# Patient Record
Sex: FEMALE | Race: BLACK OR AFRICAN AMERICAN | NOT HISPANIC OR LATINO | Employment: UNEMPLOYED | URBAN - METROPOLITAN AREA
[De-identification: names, ages, dates, MRNs, and addresses within clinical notes are randomized per-mention and may not be internally consistent; named-entity substitution may affect disease eponyms.]

---

## 2023-10-27 ENCOUNTER — TELEPHONE (OUTPATIENT)
Dept: OBSTETRICS AND GYNECOLOGY | Facility: CLINIC | Age: 38
End: 2023-10-27
Payer: COMMERCIAL

## 2023-10-27 DIAGNOSIS — Z30.41 ENCOUNTER FOR SURVEILLANCE OF CONTRACEPTIVE PILLS: ICD-10-CM

## 2023-10-27 RX ORDER — DROSPIRENONE 4 MG/1
4 TABLET, FILM COATED ORAL DAILY
Qty: 90 TABLET | Refills: 0 | OUTPATIENT
Start: 2023-10-27

## 2023-10-27 NOTE — TELEPHONE ENCOUNTER
Spoke with pt-last seen 7/2022 by MARIE Trinidad.  Informed pt she needs to schedule AE prior to RF's.   Pt scheduled 11/28 with MARIE Trinidad at  office per request.   Pt requesting Rx RF of Uzield to get her to appointment.  Order pended if approved.

## 2023-11-28 ENCOUNTER — OFFICE VISIT (OUTPATIENT)
Dept: OBSTETRICS AND GYNECOLOGY | Facility: CLINIC | Age: 38
End: 2023-11-28
Payer: COMMERCIAL

## 2023-11-28 VITALS
BODY MASS INDEX: 27.61 KG/M2 | DIASTOLIC BLOOD PRESSURE: 64 MMHG | HEIGHT: 66 IN | SYSTOLIC BLOOD PRESSURE: 104 MMHG | WEIGHT: 171.8 LBS

## 2023-11-28 DIAGNOSIS — N89.8 VAGINAL DISCHARGE: ICD-10-CM

## 2023-11-28 DIAGNOSIS — Z01.419 WELL WOMAN EXAM: Primary | ICD-10-CM

## 2023-11-28 DIAGNOSIS — Z30.011 ENCOUNTER FOR INITIAL PRESCRIPTION OF CONTRACEPTIVE PILLS: ICD-10-CM

## 2023-11-28 PROCEDURE — 99395 PREV VISIT EST AGE 18-39: CPT | Performed by: ADVANCED PRACTICE MIDWIFE

## 2023-11-28 PROCEDURE — 87205 SMEAR GRAM STAIN: CPT

## 2023-11-28 RX ORDER — DOCUSATE SODIUM 100 MG/1
CAPSULE, LIQUID FILLED ORAL
COMMUNITY
Start: 2022-06-28 | End: 2024-01-23

## 2023-11-28 RX ORDER — METRONIDAZOLE 7.5 MG/G
GEL VAGINAL
Qty: 70 G | Refills: 3 | Status: SHIPPED | OUTPATIENT
Start: 2023-11-28 | End: 2024-06-10 | Stop reason: WASHOUT

## 2023-11-28 RX ORDER — PNV NO.95/FERROUS FUM/FOLIC AC 28MG-0.8MG
TABLET ORAL
COMMUNITY

## 2023-11-28 RX ORDER — SERTRALINE HYDROCHLORIDE 50 MG/1
50 TABLET, FILM COATED ORAL DAILY
COMMUNITY
End: 2024-06-10 | Stop reason: WASHOUT

## 2023-11-28 RX ORDER — L.ACID,FERM,PLA,RHA/B.BIF,LONG 126 MG
TABLET, DELAYED AND EXTENDED RELEASE ORAL
COMMUNITY
Start: 2022-07-21

## 2023-11-28 RX ORDER — DROSPIRENONE 4 MG/1
4 TABLET, FILM COATED ORAL DAILY
Qty: 30 TABLET | Refills: 11 | Status: SHIPPED | OUTPATIENT
Start: 2023-11-28 | End: 2024-06-10

## 2023-11-28 RX ORDER — TRAZODONE HYDROCHLORIDE 50 MG/1
50 TABLET ORAL NIGHTLY
COMMUNITY
Start: 2023-06-27 | End: 2024-06-10 | Stop reason: WASHOUT

## 2023-11-28 RX ORDER — DROSPIRENONE 4 MG/1
TABLET, FILM COATED ORAL
COMMUNITY
Start: 2022-08-29

## 2023-11-28 RX ORDER — SERTRALINE HYDROCHLORIDE 100 MG/1
100 TABLET, FILM COATED ORAL DAILY
COMMUNITY
End: 2024-06-10 | Stop reason: WASHOUT

## 2023-11-28 ASSESSMENT — PAIN SCALES - GENERAL: PAINLEVEL: 0-NO PAIN

## 2023-11-28 NOTE — PROGRESS NOTES
Assessment/Plan   Problem List Items Addressed This Visit    None  Visit Diagnoses         Codes    Well woman exam    -  Primary Z01.419    Vaginal discharge     N89.8            Julissa Trinidad, APRN-CNM     Subjective   Kaylee Mclean is a 38 y.o. female who is here for a routine exam. Periods are regular every 28-30 days, lasting 4 days. Dysmenorrhea:mild, occurring first 1-2 days of flow. Cyclic symptoms include bloating, breast tenderness, and headache. No intermenstrual bleeding, spotting, or discharge.    Current contraception: none - desires to restart Slynd  [unfilled]   History of abnormal Pap smear: no  Family history of uterine or ovarian cancer: no  [unfilled]   Family history of breast cancer: no  Regular self breast exam: no  History of abnormal mammogram: no    Menstrual History:  OB History    No obstetric history on file.        Menarche age: 14  No LMP recorded.         Review of Systems  Vaginal discharge   All other ROS negative    Objective   There were no vitals taken for this visit.    General:   Alert and oriented x 3   Heart:  Thyroid: Regular rate, rhythm  Euthyroid, normal shape and size   Lungs:  Breast: Clear to auscultation bilaterally  Symmetrical, no skin changes/nipple discharge, redness, tenderness, no masses palpated bilaterally   Abdomen: Soft, non tender   Vulva: EGBUS normal   Vagina: Pink, normal discharge   Cervix: No CMT   Uterus: Normal shape, size   Adnexa: NT bilaterally

## 2023-11-29 LAB
CLUE CELLS VAG LPF-#/AREA: PRESENT /[LPF]
NUGENT SCORE: 8
YEAST VAG WET PREP-#/AREA: ABNORMAL

## 2024-01-21 DIAGNOSIS — Z76.0 MEDICATION REFILL: ICD-10-CM

## 2024-01-23 RX ORDER — DOCUSATE SODIUM 100 MG/1
100 CAPSULE, LIQUID FILLED ORAL DAILY PRN
Qty: 30 CAPSULE | Refills: 5 | Status: SHIPPED | OUTPATIENT
Start: 2024-01-23

## 2024-01-23 NOTE — TELEPHONE ENCOUNTER
Rx Request received. Medication and pharmacy populated. Pt is overdue for physical, has appt scheduled for 1/25/24.    VICKI LUNDBERG on 1/23/24 at 8:57 AM.

## 2024-01-25 ENCOUNTER — OFFICE VISIT (OUTPATIENT)
Dept: PRIMARY CARE | Facility: CLINIC | Age: 39
End: 2024-01-25
Payer: COMMERCIAL

## 2024-01-25 VITALS
TEMPERATURE: 98.1 F | RESPIRATION RATE: 18 BRPM | HEART RATE: 76 BPM | WEIGHT: 170.1 LBS | BODY MASS INDEX: 27.34 KG/M2 | OXYGEN SATURATION: 98 % | HEIGHT: 66 IN | DIASTOLIC BLOOD PRESSURE: 78 MMHG | SYSTOLIC BLOOD PRESSURE: 112 MMHG

## 2024-01-25 DIAGNOSIS — M70.62 GREATER TROCHANTERIC BURSITIS OF LEFT HIP: Primary | ICD-10-CM

## 2024-01-25 PROCEDURE — 1036F TOBACCO NON-USER: CPT | Performed by: FAMILY MEDICINE

## 2024-01-25 PROCEDURE — 99214 OFFICE O/P EST MOD 30 MIN: CPT | Performed by: FAMILY MEDICINE

## 2024-01-25 RX ORDER — IBUPROFEN 600 MG/1
600 TABLET ORAL 4 TIMES DAILY PRN
Qty: 90 TABLET | Refills: 0 | Status: SHIPPED | OUTPATIENT
Start: 2024-01-25 | End: 2024-06-10 | Stop reason: WASHOUT

## 2024-01-25 RX ORDER — METHYLPREDNISOLONE 4 MG/1
TABLET ORAL
Qty: 21 TABLET | Refills: 0 | Status: SHIPPED | OUTPATIENT
Start: 2024-01-25 | End: 2024-02-01

## 2024-01-25 ASSESSMENT — PAIN SCALES - GENERAL: PAINLEVEL: 6

## 2024-01-25 NOTE — PROGRESS NOTES
"History Of Present Illness  Kaylee Mclean is a 38 y.o. female presenting for \"Pain (Left hip pain for 1 month. ).\"    Here for left hip pain. Progressive. Started 1 month ago. No fall or injury. No LE weakness or paresthesia. No back or leg pain. Pain with laying on left side. Hasn't tried anything for pain.         Past Medical History  There are no problems to display for this patient.       Medications  Current Outpatient Medications on File Prior to Visit   Medication Sig    cyanocobalamin (Vitamin B-12) 100 mcg tablet as directed Orally    docusate sodium (Colace) 100 mg capsule TAKE 1 CAPSULE BY MOUTH EVERY DAY AS NEEDED    L.acid,ferm,sebas,rha-B.bif,long (Controlled Delivery Probiotic) 126 mg (2 billion cell) tablet,delayed and ext.release Take by mouth.    metroNIDAZOLE (Metrogel) 0.75 % (37.5mg/5 gram) vaginal gel Apply to vagina nightly for 5 nights.    sertraline (Zoloft) 100 mg tablet Take 1 tablet (100 mg) by mouth once daily.    sertraline (Zoloft) 50 mg tablet Take 1 tablet (50 mg) by mouth once daily.    Slynd 4 mg (28) tablet Take by mouth.    drospirenone, contraceptive, (Slynd) 4 mg (28) tablet Take 1 tablet by mouth once daily.    traZODone (Desyrel) 50 mg tablet Take 1 tablet (50 mg) by mouth once daily at bedtime.     No current facility-administered medications on file prior to visit.        Surgical History  She has no past surgical history on file.     Social History  She reports that she has never smoked. She has never used smokeless tobacco. She reports current alcohol use. She reports current drug use. Drug: Marijuana.    Family History  Family History   Problem Relation Name Age of Onset    Hypertension Mother      Diabetes Maternal Grandmother      Cancer Paternal Grandmother      Cancer Paternal Grandfather          Allergies  Patient has no known allergies.    ROS  Negative, except in HPI     Last Recorded Vitals  /78 (BP Location: Left arm, Patient Position: Sitting, BP Cuff " Size: Adult)   Pulse 76   Temp 36.7 °C (98.1 °F) (Temporal)   Resp 18   Wt 77.2 kg (170 lb 1.6 oz)   SpO2 98%   Body mass index is 27.45 kg/m².     Physical Exam  Vitals and nursing note reviewed.   Constitutional:       General: She is not in acute distress.     Appearance: Normal appearance.   Musculoskeletal:         General: Normal range of motion.      Right hip: No bony tenderness. Normal range of motion.      Left hip: Bony tenderness (at greater trochanter) present. Normal range of motion.   Neurological:      Mental Status: She is alert.         Relevant Results  Lab Results   Component Value Date    WBC 7.2 05/24/2023    WBC 7.6 05/28/2020    HGB 12.5 05/24/2023    HGB 12.9 05/28/2020    HCT 36.5 05/24/2023    HCT 39.9 05/28/2020    MCV 83.7 05/24/2023    MCV 84.5 05/28/2020     05/24/2023     05/28/2020     Lab Results   Component Value Date     05/24/2023     03/14/2022    K 4.7 05/24/2023    K 4.0 03/14/2022     05/24/2023     03/14/2022    CO2 23 (L) 05/24/2023    CO2 22 (L) 03/14/2022    BUN 9 05/24/2023    BUN 12 03/14/2022    CREATININE 0.9 05/24/2023    CREATININE 0.7 03/14/2022    CALCIUM 9.3 05/24/2023    CALCIUM 9.1 03/14/2022    PROT 7.5 05/24/2023    PROT 7.3 03/14/2022    BILITOT 0.3 05/24/2023    BILITOT 0.3 03/14/2022    ALKPHOS 69 05/24/2023    ALKPHOS 67 03/14/2022    ALT 14 05/24/2023    ALT 8 03/14/2022    AST 16 05/24/2023    AST 12 03/14/2022    GLUCOSE 90 05/24/2023    GLUCOSE 91 03/14/2022       Assessment/Plan   Kaylee was seen today for pain.  Diagnoses and all orders for this visit:  Greater trochanteric bursitis of left hip (Primary)  Comments:  Exercise handout given, conservative mgmt, PT if she can. If pain worsens, follow up for corticosteroid injection  Orders:  -     methylPREDNISolone (Medrol Dospak) 4 mg tablets; Take as directed on package.  -     ibuprofen 600 mg tablet; Take 1 tablet (600 mg) by mouth 4 times a day as needed  for mild pain (1 - 3) (pain).  -     Referral to Physical Therapy; Future     There are no discontinued medications.        Manolo Willis MD

## 2024-04-18 ENCOUNTER — APPOINTMENT (OUTPATIENT)
Dept: OBSTETRICS AND GYNECOLOGY | Facility: CLINIC | Age: 39
End: 2024-04-18
Payer: COMMERCIAL

## 2024-06-04 ENCOUNTER — TELEPHONE (OUTPATIENT)
Dept: OBSTETRICS AND GYNECOLOGY | Facility: CLINIC | Age: 39
End: 2024-06-04
Payer: COMMERCIAL

## 2024-06-04 ENCOUNTER — TELEPHONE (OUTPATIENT)
Dept: OBSTETRICS AND GYNECOLOGY | Facility: HOSPITAL | Age: 39
End: 2024-06-04
Payer: COMMERCIAL

## 2024-06-04 NOTE — TELEPHONE ENCOUNTER
Kaylee calling in today to request a follow up visit with Pamela. Patient identified by name and . Reports that she is back and forth from [place] to [place], having a difficult time with care in [place]. She saw Pamela for an annual, has a been experiencing new onset of bladder dysfunction, reports on going incontinence and pain, also reports that a golf ball sized ovarian cyst was found incidentally, desires to be soon Monday 6/10. Reviewed with patient, should she need an operative intervention we recommend that she see's an MD. Scheduled with Dr. Erickson for 6/10. All questions and concerns addressed. Reviewed importance of bringing medical records with her.  Melodie Bourgeois MSN, RN, CLC

## 2024-06-10 ENCOUNTER — OFFICE VISIT (OUTPATIENT)
Dept: OBSTETRICS AND GYNECOLOGY | Facility: CLINIC | Age: 39
End: 2024-06-10
Payer: COMMERCIAL

## 2024-06-10 VITALS
SYSTOLIC BLOOD PRESSURE: 124 MMHG | WEIGHT: 164 LBS | HEIGHT: 66 IN | DIASTOLIC BLOOD PRESSURE: 93 MMHG | BODY MASS INDEX: 26.36 KG/M2

## 2024-06-10 DIAGNOSIS — M48.00 SPINAL STENOSIS, UNSPECIFIED SPINAL REGION: ICD-10-CM

## 2024-06-10 DIAGNOSIS — N83.201 RIGHT OVARIAN CYST: Primary | ICD-10-CM

## 2024-06-10 DIAGNOSIS — N39.46 MIXED STRESS AND URGE URINARY INCONTINENCE: ICD-10-CM

## 2024-06-10 PROCEDURE — 1036F TOBACCO NON-USER: CPT | Performed by: OBSTETRICS & GYNECOLOGY

## 2024-06-10 PROCEDURE — 87086 URINE CULTURE/COLONY COUNT: CPT

## 2024-06-10 PROCEDURE — 99215 OFFICE O/P EST HI 40 MIN: CPT | Performed by: OBSTETRICS & GYNECOLOGY

## 2024-06-10 RX ORDER — PYRIDOXINE HCL (VITAMIN B6) 25 MG
TABLET ORAL
COMMUNITY

## 2024-06-10 ASSESSMENT — PAIN SCALES - GENERAL: PAINLEVEL: 0-NO PAIN

## 2024-06-10 NOTE — PROGRESS NOTES
"SUBJECTIVE    38 y.o.  Having periods female presents for   Chief Complaint   Patient presents with    Ovarian Cyst     New patient is here for consult re: ovarian cyst.  Last pap:  2022  neg/neg  Accepts chaperone.   Bethany Oleary LPN      Pt presents to discuss ovarian cyst.  Pt reports that she has left sciatica and this resulted in a CT scan to evaluate her back on 2024.  At that time she was told she had a golf-ball sized cyst on her right ovary. She was also diagnosed with spinal stenosis and she was told to follow up with a spine surgeon.  She does have an appt set up for Monday ([facility]os at [facility]).    The patient did bring  records from [place] with her today-- the discharge instructions reports she had a 3cm right sided ovarian cyst.    She has also noted issues with intermittent urinary incontinence.  She reports leaking each day.  Denies urinary dysuria.  She is most likely to leak first thing in the AM or with some sense of urgency.  When she leaks she does leak a large amount.  She leaked occasionally with cough or sneeze.  She was treated for a UTI in March. She was seen in the ED 2024 and had a negative UA at that time. When she was in the ED in [place] she had a post-void bladder scan that was \"normal.\"    OB/GYN History  Patient's last menstrual period was 2024 (approximate).    Social History     Substance and Sexual Activity   Sexual Activity Yes    Partners: Male    Birth control/protection: OCP       OB History    Para Term  AB Living   10 4 4   4 4   SAB IAB Ectopic Multiple Live Births   2       4      # Outcome Date GA Lbr Nura/2nd Weight Sex Delivery Anes PTL Lv   10 Term 2017    F Vag-Spont   AMNA   9 Term 2016    F Vag-Spont   AMNA   8 Term 2009    M Vag-Spont   AMNA   7 Term 2005    M Vag-Spont   AMNA   6             5             4 AB            3 AB            2 SAB            1 SAB                Past Medical " History  She has a past medical history of Anxiety, Hypertension, Sciatica (03/2024), and Spinal stenosis (04/2024).    Surgical History  She has no past surgical history on file.     Social History  She reports that she has never smoked. She has never used smokeless tobacco. She reports current alcohol use. She reports current drug use. Drug: Marijuana.    Screenings  Social Determinants of Health     Tobacco Use: Low Risk  (6/10/2024)    Patient History     Smoking Tobacco Use: Never     Smokeless Tobacco Use: Never     Passive Exposure: Not on file   Alcohol Use: Not At Risk (5/31/2024)    Received from [employer]    AUDIT-C     Frequency of Alcohol Consumption: Never     Average Number of Drinks: Patient does not drink     Frequency of Binge Drinking: Never   Financial Resource Strain: At Risk (1/24/2023)    Received from [employer]     Financial Resource Strain     Financial Resource Strain: 2   Food Insecurity: At Risk (1/24/2023)    Received from [employer]     Food Insecurity     Food: 2   Transportation Needs: At Risk (1/24/2023)    Received from [employer]     Transportation Needs     Transportation: 2   Physical Activity: Not on File (1/24/2023)    Received from [employer]     Physical Activity     Physical Activity: 0   Stress: At Risk (1/24/2023)    Received from [employer]     Stress     Stress: 2   Social Connections: Not at Risk (1/24/2023)    Received from [employer]     Social Connections     Social Connections and Isolation: 1   Intimate Partner Violence: Unknown (5/31/2024)    Received from [facility] [employer]    Intimate Partner Violence     Feels physically and emotionally safe: Not on file     Fear of partner: Not on file   Depression: At risk (4/12/2024)    Received from [facility]    PHQ-2     PHQ-2 score: 3   Housing Stability: Unknown (5/31/2024)    Received from [employer]    Housing Stability     Housing situation: Not on file     Worried about losing housing: Not on file   Utilities:  "Not on file   [employer]: Not on file   Health Literacy: Adequate Health Literacy (5/31/2024)    Received from [facility]     Health Literacy     Frequency of need for help with medical instructions: Never         OBJECTIVE  Vitals:    06/10/24 0933   BP: (!) 124/93   Weight: 74.4 kg (164 lb)   Height: 1.676 m (5' 6\")     Body mass index is 26.47 kg/m².     Chaperone: Present: yes  OBGyn Exam deferred (pt opted against PVR).      ASSESSMENT & PLAN  Problem List Items Addressed This Visit          Ob-Gyn Problems    Right ovarian cyst - Primary    Relevant Orders    US PELVIS TRANSABDOMINAL WITH TRANSVAGINAL       Other    Spinal stenosis     Other Visit Diagnoses       Mixed stress and urge urinary incontinence        Relevant Orders    Urine Culture            Follow up: Reviewed likely benign nature of right sided cyst-- most likely functional. Will repeat ultrasound. Pt currently working in [place] but has [place] Medicaid which is limiting her outpatient follow up.    Discussed potential connection between spinal stenosis and her urinary complaints. Has appt with spine surgeon and encouraged her to keep that.  Declined PVR today. Will check urine culture.    Will follow up with test results.    Total time:  Chart review: 5 minutes  Review of outside records at visit: 5 minutes  Counseling/history: 30 minutes  Charting/oder entry: 5 minutes  Total: 45 minutes    Navdeep Erickson MD  Obstetrics & Gynecology  06/10/24  "

## 2024-06-11 LAB — BACTERIA UR CULT: NORMAL

## 2024-06-17 ENCOUNTER — LAB (OUTPATIENT)
Dept: LAB | Facility: LAB | Age: 39
End: 2024-06-17
Payer: COMMERCIAL

## 2024-06-17 ENCOUNTER — HOSPITAL ENCOUNTER (OUTPATIENT)
Dept: RADIOLOGY | Facility: CLINIC | Age: 39
Discharge: HOME | End: 2024-06-17
Payer: COMMERCIAL

## 2024-06-17 DIAGNOSIS — N83.201 RIGHT OVARIAN CYST: ICD-10-CM

## 2024-06-17 PROCEDURE — 76856 US EXAM PELVIC COMPLETE: CPT | Performed by: RADIOLOGY

## 2024-06-17 PROCEDURE — 76830 TRANSVAGINAL US NON-OB: CPT | Performed by: RADIOLOGY

## 2024-06-17 PROCEDURE — 76856 US EXAM PELVIC COMPLETE: CPT

## 2024-06-24 ENCOUNTER — APPOINTMENT (OUTPATIENT)
Dept: OBSTETRICS AND GYNECOLOGY | Facility: CLINIC | Age: 39
End: 2024-06-24
Payer: COMMERCIAL

## 2024-07-03 ENCOUNTER — TELEPHONE (OUTPATIENT)
Dept: OBSTETRICS AND GYNECOLOGY | Facility: CLINIC | Age: 39
End: 2024-07-03
Payer: COMMERCIAL

## 2024-07-03 DIAGNOSIS — N83.201 RIGHT OVARIAN CYST: Primary | ICD-10-CM

## 2024-08-02 ENCOUNTER — HOSPITAL ENCOUNTER (OUTPATIENT)
Dept: RADIOLOGY | Facility: CLINIC | Age: 39
Discharge: HOME | End: 2024-08-02
Payer: COMMERCIAL

## 2024-08-02 DIAGNOSIS — N83.201 RIGHT OVARIAN CYST: ICD-10-CM

## 2024-08-02 PROCEDURE — 76856 US EXAM PELVIC COMPLETE: CPT
